# Patient Record
Sex: FEMALE | Race: WHITE | Employment: STUDENT | URBAN - METROPOLITAN AREA
[De-identification: names, ages, dates, MRNs, and addresses within clinical notes are randomized per-mention and may not be internally consistent; named-entity substitution may affect disease eponyms.]

---

## 2020-06-30 ENCOUNTER — VIRTUAL VISIT (OUTPATIENT)
Dept: INTERNAL MEDICINE | Facility: CLINIC | Age: 25
End: 2020-06-30
Payer: COMMERCIAL

## 2020-06-30 DIAGNOSIS — Z11.1 SCREENING EXAMINATION FOR PULMONARY TUBERCULOSIS: Primary | ICD-10-CM

## 2020-06-30 DIAGNOSIS — Z86.19 HISTORY OF CHICKEN POX: ICD-10-CM

## 2020-06-30 PROBLEM — L40.9 PSORIASIS: Status: ACTIVE | Noted: 2020-06-30

## 2020-06-30 PROCEDURE — 99201 ZZC OFFICE/OUTPT VISIT, NEW, LEVEL I: CPT | Mod: 95 | Performed by: PHYSICIAN ASSISTANT

## 2020-06-30 RX ORDER — GUSELKUMAB 100 MG/ML
INJECTION SUBCUTANEOUS
COMMUNITY
Start: 2020-06-30

## 2020-06-30 NOTE — PROGRESS NOTES
"  Sandra Key is a 25 year old female who is being evaluated via a billable video visit.      The patient has been notified of following:     \"This video visit will be conducted via a call between you and your physician/provider. We have found that certain health care needs can be provided without the need for an in-person physical exam.  This service lets us provide the care you need with a video conversation.  If a prescription is necessary we can send it directly to your pharmacy.  If lab work is needed we can place an order for that and you can then stop by our lab to have the test done at a later time.    Video visits are billed at different rates depending on your insurance coverage.  Please reach out to your insurance provider with any questions.    If during the course of the call the physician/provider feels a video visit is not appropriate, you will not be charged for this service.\"    Patient has given verbal consent for Video visit? Yes  How would you like to obtain your AVS? Mail a copy  Patient would like the video invitation sent by: Text to cell phone: [phone]  Will anyone else be joining your video visit? No      Subjective     Sandra Key is a 25 year old female who presents today via video visit for the following health issues:    HPI    Patient needs to get TB testing and vaccine for school.     - h/o chicken pox infection   - no h/o abnormal TB test   - no h/o TB exposure   - no TB symptoms       Reviewed and updated as needed this visit by Provider  Meds  Problems               Objective    Vitals - Patient Reported  Weight (Patient Reported): 61.2 kg (135 lb)  Height (Patient Reported): 162.6 cm (5' 4\")  BMI (Based on Pt Reported Ht/Wt): 23.17        Physical Exam     GENERAL: Healthy, alert and no distress  PSYCH: Mentation appears normal, affect normal/bright, judgement and insight intact, normal speech and appearance well-groomed.      Diagnostic Test " Results:  Labs reviewed in Epic        Assessment & Plan     1. Screening examination for pulmonary tuberculosis  - Quantiferon TB Gold Plus; Future    2. History of chicken pox  - Varicella Zoster Virus Antibody IgG; Future       No follow-ups on file.    Marcela Amaro PA-C  [facility]        No follow-ups on file.     Duration: 6 minutes     Marcela Amaro PA-C

## 2020-07-01 DIAGNOSIS — Z86.19 HISTORY OF CHICKEN POX: ICD-10-CM

## 2020-07-01 DIAGNOSIS — Z11.1 SCREENING EXAMINATION FOR PULMONARY TUBERCULOSIS: ICD-10-CM

## 2020-07-01 PROCEDURE — 86787 VARICELLA-ZOSTER ANTIBODY: CPT | Performed by: PHYSICIAN ASSISTANT

## 2020-07-01 PROCEDURE — 86481 TB AG RESPONSE T-CELL SUSP: CPT | Performed by: PHYSICIAN ASSISTANT

## 2020-07-01 PROCEDURE — 36415 COLL VENOUS BLD VENIPUNCTURE: CPT | Performed by: PHYSICIAN ASSISTANT

## 2020-07-02 LAB — VZV IGG SER QL IA: 1.6 AI (ref 0–0.8)

## 2020-07-03 LAB
GAMMA INTERFERON BACKGROUND BLD IA-ACNC: 0.02 IU/ML
M TB IFN-G BLD-IMP: NEGATIVE
M TB IFN-G CD4+ BCKGRND COR BLD-ACNC: 8.1 IU/ML
MITOGEN IGNF BCKGRD COR BLD-ACNC: 0 IU/ML
MITOGEN IGNF BCKGRD COR BLD-ACNC: 0.01 IU/ML

## 2020-08-21 ENCOUNTER — OFFICE VISIT (OUTPATIENT)
Dept: FAMILY MEDICINE | Facility: CLINIC | Age: 25
End: 2020-08-21
Payer: COMMERCIAL

## 2020-08-21 VITALS
BODY MASS INDEX: 21.68 KG/M2 | RESPIRATION RATE: 14 BRPM | DIASTOLIC BLOOD PRESSURE: 68 MMHG | HEART RATE: 85 BPM | WEIGHT: 127 LBS | HEIGHT: 64 IN | TEMPERATURE: 98 F | OXYGEN SATURATION: 99 % | SYSTOLIC BLOOD PRESSURE: 110 MMHG

## 2020-08-21 DIAGNOSIS — S09.91XA INJURY OF RIGHT EAR, INITIAL ENCOUNTER: ICD-10-CM

## 2020-08-21 DIAGNOSIS — B37.31 YEAST VAGINITIS: ICD-10-CM

## 2020-08-21 DIAGNOSIS — Z11.3 SCREEN FOR STD (SEXUALLY TRANSMITTED DISEASE): ICD-10-CM

## 2020-08-21 DIAGNOSIS — Z12.4 SCREENING FOR MALIGNANT NEOPLASM OF CERVIX: ICD-10-CM

## 2020-08-21 DIAGNOSIS — Z00.00 ROUTINE GENERAL MEDICAL EXAMINATION AT A HEALTH CARE FACILITY: Primary | ICD-10-CM

## 2020-08-21 LAB
SPECIMEN SOURCE: ABNORMAL
T PALLIDUM AB SER QL: NONREACTIVE
WET PREP SPEC: ABNORMAL

## 2020-08-21 PROCEDURE — 86780 TREPONEMA PALLIDUM: CPT | Performed by: PHYSICIAN ASSISTANT

## 2020-08-21 PROCEDURE — 87389 HIV-1 AG W/HIV-1&-2 AB AG IA: CPT | Performed by: PHYSICIAN ASSISTANT

## 2020-08-21 PROCEDURE — 86803 HEPATITIS C AB TEST: CPT | Performed by: PHYSICIAN ASSISTANT

## 2020-08-21 PROCEDURE — 87491 CHLMYD TRACH DNA AMP PROBE: CPT | Performed by: PHYSICIAN ASSISTANT

## 2020-08-21 PROCEDURE — 36415 COLL VENOUS BLD VENIPUNCTURE: CPT | Performed by: PHYSICIAN ASSISTANT

## 2020-08-21 PROCEDURE — 99395 PREV VISIT EST AGE 18-39: CPT | Performed by: PHYSICIAN ASSISTANT

## 2020-08-21 PROCEDURE — 87591 N.GONORRHOEAE DNA AMP PROB: CPT | Performed by: PHYSICIAN ASSISTANT

## 2020-08-21 PROCEDURE — 87210 SMEAR WET MOUNT SALINE/INK: CPT | Performed by: PHYSICIAN ASSISTANT

## 2020-08-21 PROCEDURE — G0145 SCR C/V CYTO,THINLAYER,RESCR: HCPCS | Performed by: PHYSICIAN ASSISTANT

## 2020-08-21 ASSESSMENT — MIFFLIN-ST. JEOR: SCORE: 1311.07

## 2020-08-21 NOTE — PROGRESS NOTES
h   SUBJECTIVE:   CC: Sandra Key is an 25 year old woman who presents for preventive health visit.     Healthy Habits:    Do you get at least three servings of calcium containing foods daily (dairy, green leafy vegetables, etc.)? yes    Amount of exercise or daily activities, outside of work: 6-7 day(s) per week    Problems taking medications regularly No    Medication side effects: No    Have you had an eye exam in the past two years? yes    Do you see a dentist twice per year? yes    Do you have sleep apnea, excessive snoring or daytime drowsiness?no    - Patient with new sex partner. After intercourse, developed vulvar itching and burning, intermittent sx. Used Monistat ovule 2 nights ago, now with some white colored discharge; doesn't know if it's the Monistat or something else. Requests STD screening.  - Was using Q-tip to remove ear wax. Inserted too far into Rt canal and developed severe pain. Resolved within a minute or so, but now gets intermittent dried blood from the canal. No change to hearing.    Today's PHQ-2 Score:   PHQ-2 ( 1999 Pfizer) 6/30/2020   Q1: Little interest or pleasure in doing things 0   Q2: Feeling down, depressed or hopeless 0   PHQ-2 Score 0     Abuse: Current or Past(Physical, Sexual or Emotional)- No  Do you feel safe in your environment? Yes    Social History     Tobacco Use     Smoking status: Never Smoker     Smokeless tobacco: Never Used   Substance Use Topics     Alcohol use: Yes     Comment: socially/occasionally     If you drink alcohol do you typically have >3 drinks per day or >7 drinks per week? No                     Reviewed orders with patient.  Reviewed health maintenance and updated orders accordingly - Yes  BP Readings from Last 3 Encounters:   08/21/20 110/68    Wt Readings from Last 3 Encounters:   08/21/20 57.6 kg (127 lb)            Patient Active Problem List   Diagnosis     Psoriasis     History reviewed. No pertinent surgical history.    Social  "History     Tobacco Use     Smoking status: Never Smoker     Smokeless tobacco: Never Used   Substance Use Topics     Alcohol use: Yes     Comment: socially/occasionally     History reviewed. No pertinent family history.        Mammogram not appropriate for this patient based on age.    Pertinent mammograms are reviewed under the imaging tab.  History of abnormal Pap smear: NO - age 21-29 PAP every 3 years recommended     Reviewed and updated as needed this visit by clinical staff  Tobacco  Allergies  Meds  Med Hx  Surg Hx  Fam Hx  Soc Hx        Reviewed and updated as needed this visit by Provider            ROS:  CONSTITUTIONAL: NEGATIVE for fever, chills, change in weight  INTEGUMENTARU/SKIN: NEGATIVE for worrisome rashes, moles or lesions  EYES: NEGATIVE for vision changes or irritation  ENT: NEGATIVE for ear, mouth and throat problems  RESP: NEGATIVE for significant cough or SOB  BREAST: NEGATIVE for masses, tenderness or discharge  CV: NEGATIVE for chest pain, palpitations or peripheral edema  GI: NEGATIVE for nausea, abdominal pain, heartburn, or change in bowel habits  : NEGATIVE for unusual urinary or vaginal symptoms. Periods are regular.  MUSCULOSKELETAL: NEGATIVE for significant arthralgias or myalgia  NEURO: NEGATIVE for weakness, dizziness or paresthesias  PSYCHIATRIC: NEGATIVE for changes in mood or affect    OBJECTIVE:   /68   Pulse 85   Temp 98  F (36.7  C) (Tympanic)   Resp 14   Ht 1.626 m (5' 4\")   Wt 57.6 kg (127 lb)   LMP 07/01/2020 (Approximate)   SpO2 99%   BMI 21.80 kg/m    EXAM:  GENERAL: healthy, alert and no distress  EYES: Eyes grossly normal to inspection, PERRL and conjunctivae and sclerae normal  HENT: ear canals normal, scant dried blood on Rt TM; bilat TMs intact, pearly gray w/o effusion. nose and mouth without ulcers or lesions  NECK: no adenopathy, no asymmetry, masses, or scars and thyroid normal to palpation  RESP: lungs clear to auscultation - no rales, " rhonchi or wheezes  BREAST: normal without masses, tenderness or nipple discharge and no palpable axillary masses or adenopathy  CV: regular rate and rhythm, normal S1 S2, no S3 or S4, no murmur, click or rub, no peripheral edema and peripheral pulses strong   (female): normal female external genitalia, normal urethral meatus, vaginal mucosa pink, moist, well rugated, and normal cervix/adnexa/uterus without masses. Copious white cream-like substance.  MS: no gross musculoskeletal defects noted, no edema  SKIN: no suspicious lesions or rashes  NEURO: Normal strength and tone, mentation intact and speech normal  PSYCH: mentation appears normal, affect normal/bright    Diagnostic Test Results:  Results for orders placed or performed in visit on 08/21/20 (from the past 24 hour(s))   Wet prep    Specimen: Vagina   Result Value Ref Range    Specimen Description Vagina     Wet Prep Moderate  Yeast seen   (A)     Wet Prep Few WBCs seen     Wet Prep Rare  Clue cells seen   (A)     Wet Prep No Trichomonas seen        ASSESSMENT/PLAN:       ICD-10-CM    1. Routine general medical examination at a health care facility  Z00.00    2. Yeast vaginitis  B37.3 Wet prep   3. Injury of right ear, initial encounter  S09.91XA    4. Screening for malignant neoplasm of cervix  Z12.4 Pap imaged thin layer screen only - recommended age 21 - 24 years   5. Screen for STD (sexually transmitted disease)  Z11.3 Chlamydia trachomatis PCR     Neisseria gonorrhoeae PCR     Wet prep     HIV Antigen Antibody Combo     Treponema Abs w Reflex to RPR and Titer     Hepatitis C Screen Reflex to HCV RNA Quant and Genotype     - Wet prep consistent with yeast vaginitis, already treated with OTC Monistat. No further management indicated.  - Lab work pending, will inform you of results when they return.  - Evidence of mechanical trauma to Rt TM, no rupture, reassured patient of routine healing. Avoid inserting anything into ear canal to prevent further  "damage.    COUNSELING:   Reviewed preventive health counseling, as reflected in patient instructions       Safe sex practices/STD prevention    Estimated body mass index is 21.8 kg/m  as calculated from the following:    Height as of this encounter: 1.626 m (5' 4\").    Weight as of this encounter: 57.6 kg (127 lb).         reports that she has never smoked. She has never used smokeless tobacco.      Counseling Resources:  ATP IV Guidelines  Pooled Cohorts Equation Calculator  Breast Cancer Risk Calculator  FRAX Risk Assessment  ICSI Preventive Guidelines  Dietary Guidelines for Americans, 2010  USDA's MyPlate  ASA Prophylaxis  Lung CA Screening    Isamar Porras PA-C  [facility]  "

## 2020-08-23 LAB
C TRACH DNA SPEC QL NAA+PROBE: NEGATIVE
N GONORRHOEA DNA SPEC QL NAA+PROBE: NEGATIVE
SPECIMEN SOURCE: NORMAL
SPECIMEN SOURCE: NORMAL

## 2020-08-24 LAB
HCV AB SERPL QL IA: NONREACTIVE
HIV 1+2 AB+HIV1 P24 AG SERPL QL IA: NONREACTIVE

## 2020-08-24 NOTE — RESULT ENCOUNTER NOTE
Darin Flores    I just wanted to let you know that your lab results have been reviewed and are attached.    - Your STD test panel was negative for infection.  - You will receive your Pap Smear results in a separate message.    Please let me know if you have any questions.    Sincerely,    Isamar Porras PA-C    [facility]  [address]  [place], [place] [address] [phone] (p)

## 2020-08-27 LAB
COPATH REPORT: NORMAL
PAP: NORMAL

## 2020-10-30 ENCOUNTER — OFFICE VISIT (OUTPATIENT)
Dept: FAMILY MEDICINE | Facility: CLINIC | Age: 25
End: 2020-10-30
Payer: COMMERCIAL

## 2020-10-30 VITALS
BODY MASS INDEX: 22.49 KG/M2 | WEIGHT: 131 LBS | RESPIRATION RATE: 14 BRPM | HEART RATE: 78 BPM | TEMPERATURE: 97 F | DIASTOLIC BLOOD PRESSURE: 60 MMHG | SYSTOLIC BLOOD PRESSURE: 110 MMHG

## 2020-10-30 DIAGNOSIS — Z23 NEED FOR VACCINATION: ICD-10-CM

## 2020-10-30 DIAGNOSIS — Z97.5 NEXPLANON IN PLACE: ICD-10-CM

## 2020-10-30 DIAGNOSIS — N91.1 DRUG INDUCED AMENORRHEA: Primary | ICD-10-CM

## 2020-10-30 DIAGNOSIS — T50.905A DRUG INDUCED AMENORRHEA: Primary | ICD-10-CM

## 2020-10-30 PROCEDURE — 90471 IMMUNIZATION ADMIN: CPT | Performed by: INTERNAL MEDICINE

## 2020-10-30 PROCEDURE — 99213 OFFICE O/P EST LOW 20 MIN: CPT | Mod: 25 | Performed by: INTERNAL MEDICINE

## 2020-10-30 PROCEDURE — 90715 TDAP VACCINE 7 YRS/> IM: CPT | Performed by: INTERNAL MEDICINE

## 2020-10-30 NOTE — PATIENT INSTRUCTIONS
Your menstrual absence is due to Nexplanon, no need of any concerns.     ===========================    Patient Education     Etonogestrel implant  What is this medicine?  ETONOGESTREL (et oh leena ARISTEO trel) is a contraceptive (birth control) device. It is used to prevent pregnancy. It can be used for up to 3 years.  How should I use this medicine?  This device is inserted just under the skin on the inner side of your upper arm by a health care professional.  Talk to your pediatrician regarding the use of this medicine in children. Special care may be needed.  What side effects may I notice from receiving this medicine?  Side effects that you should report to your doctor or health care professional as soon as possible:    allergic reactions like skin rash, itching or hives, swelling of the face, lips, or tongue    breast lumps    changes in emotions or moods    depressed mood    heavy or prolonged menstrual bleeding    pain, irritation, swelling, or bruising at the insertion site    scar at site of insertion    signs of infection at the insertion site such as fever, and skin redness, pain or discharge    signs of pregnancy    signs and symptoms of a blood clot such as breathing problems; changes in vision; chest pain; severe, sudden headache; pain, swelling, warmth in the leg; trouble speaking; sudden numbness or weakness of the face, arm or leg    signs and symptoms of liver injury like dark yellow or brown urine; general ill feeling or flu-like symptoms; light-colored stools; loss of appetite; nausea; right upper belly pain; unusually weak or tired; yellowing of the eyes or skin    unusual vaginal bleeding, discharge    signs and symptoms of a stroke like changes in vision; confusion; trouble speaking or understanding; severe headaches; sudden numbness or weakness of the face, arm or leg; trouble walking; dizziness; loss of balance or coordination  Side effects that usually do not require medical attention (report  to your doctor or health care professional if they continue or are bothersome):    acne    back pain    breast pain    changes in weight    dizziness    general ill feeling or flu-like symptoms    headache    irregular menstrual bleeding    nausea    sore throat    vaginal irritation or inflammation  What may interact with this medicine?  Do not take this medicine with any of the following medications:    amprenavir    fosamprenavir  This medicine may also interact with the following medications:    acitretin    aprepitant    armodafinil    bexarotene    bosentan    carbamazepine    certain medicines for fungal infections like fluconazole, ketoconazole, itraconazole and voriconazole    certain medicines to treat hepatitis, HIV or AIDS    cyclosporine    felbamate    griseofulvin    lamotrigine    modafinil    oxcarbazepine    phenobarbital    phenytoin    primidone    rifabutin    rifampin    rifapentine    Jeanie's wort    topiramate  What if I miss a dose?  This does not apply.  Where should I keep my medicine?  This drug is given in a hospital or clinic and will not be stored at home.  What should I tell my health care provider before I take this medicine?  They need to know if you have any of these conditions:    abnormal vaginal bleeding    blood vessel disease or blood clots    breast, cervical, endometrial, ovarian, liver, or uterine cancer    diabetes    gallbladder disease    heart disease or recent heart attack    high blood pressure    high cholesterol or triglycerides    kidney disease    liver disease    migraine headaches    seizures    stroke    tobacco smoker    an unusual or allergic reaction to etonogestrel, anesthetics or antiseptics, other medicines, foods, dyes, or preservatives    pregnant or trying to get pregnant    breast-feeding  What should I watch for while using this medicine?  This product does not protect you against HIV infection (AIDS) or other sexually transmitted diseases.  You  should be able to feel the implant by pressing your fingertips over the skin where it was inserted. Contact your doctor if you cannot feel the implant, and use a non-hormonal birth control method (such as condoms) until your doctor confirms that the implant is in place. Contact your doctor if you think that the implant may have broken or become bent while in your arm.  You will receive a user card from your health care provider after the implant is inserted. The card is a record of the location of the implant in your upper arm and when it should be removed. Keep this card with your health records.  NOTE:This sheet is a summary. It may not cover all possible information. If you have questions about this medicine, talk to your doctor, pharmacist, or health care provider. Copyright  2020 [employer]

## 2020-10-30 NOTE — NURSING NOTE
Prior to immunization administration, verified patients identity using patient s name and date of birth. Please see Immunization Activity for additional information.     Screening Questionnaire for Adult Immunization    Are you sick today?   No   Do you have allergies to medications, food, a vaccine component or latex?   No   Have you ever had a serious reaction after receiving a vaccination?   No   Do you have a long-term health problem with heart, lung, kidney, or metabolic disease (e.g., diabetes), asthma, a blood disorder, no spleen, complement component deficiency, a cochlear implant, or a spinal fluid leak?  Are you on long-term aspirin therapy?   No   Do you have cancer, leukemia, HIV/AIDS, or any other immune system problem?   No   Do you have a parent, brother, or sister with an immune system problem?   No   In the past 3 months, have you taken medications that affect  your immune system, such as prednisone, other steroids, or anticancer drugs; drugs for the treatment of rheumatoid arthritis, Crohn s disease, or psoriasis; or have you had radiation treatments?   No   Have you had a seizure, or a brain or other nervous system problem?   No   During the past year, have you received a transfusion of blood or blood    products, or been given immune (gamma) globulin or antiviral drug?   No   For women: Are you pregnant or is there a chance you could become       pregnant during the next month?   No   Have you received any vaccinations in the past 4 weeks?   No     Immunization questionnaire answers were all negative.        Per orders of Dr. Michaels, injection of tdap given by Brigid Vick CMA. Patient instructed to remain in clinic for 15 minutes afterwards, and to report any adverse reaction to me immediately.       Screening performed by Brigid Vick CMA on 10/30/2020 at 4:29 PM.

## 2020-10-30 NOTE — PROGRESS NOTES
Subjective     Sandra No Mi Yesi is a 25 year old female who presents to clinic today for the following health issues:    HPI         Concern - Amenorrhea  Onset: since June  Description: LMP 6/30/20  Intensity: mild  Progression of Symptoms:  same  Accompanying Signs & Symptoms: none  Previous history of similar problem: Pt has nexplanon  Precipitating factors:        Worsened by: none  Alleviating factors:        Improved by: none  Therapies tried and outcome: negative pregnancy test     No Known Allergies     History reviewed. No pertinent past medical history.    History reviewed. No pertinent surgical history.    History reviewed. No pertinent family history.    Social History     Tobacco Use     Smoking status: Never Smoker     Smokeless tobacco: Never Used   Substance Use Topics     Alcohol use: Yes     Comment: socially/occasionally        Current Outpatient Medications   Medication     etonogestrel (NEXPLANON) 68 MG IMPL     Guselkumab (TREMFYA) 100 MG/ML SOPN     No current facility-administered medications for this visit.         Review of Systems   Constitutional, HEENT, cardiovascular, pulmonary, GI, , musculoskeletal, neuro, skin, endocrine and psych systems are negative, except as otherwise noted.      Objective    /60   Pulse 78   Temp 97  F (36.1  C) (Tympanic)   Resp 14   Wt 59.4 kg (131 lb)   LMP 06/30/2020   BMI 22.49 kg/m    Body mass index is 22.49 kg/m .  Physical Exam   GENERAL: healthy, alert and no distress  NECK: no adenopathy, no asymmetry, masses, or scars and thyroid normal to palpation  RESP: lungs clear to auscultation - no rales, rhonchi or wheezes  CV: regular rate and rhythm, normal S1 S2, no S3 or S4, no murmur, click or rub, no peripheral edema and peripheral pulses strong  ABDOMEN: soft, nontender, no hepatosplenomegaly, no masses and bowel sounds normal  MS: no gross musculoskeletal defects noted, no edema  POSITIVE for Nexplanon on the medial aspect of  the Rt upper arm.    Labs and imaging reviewed and discussed with the patient.        Assessment and Plan  1. Drug induced amenorrhea  2. Nexplanon in place  Reasssured the patient that her menstrual disturbances are due to her Nexplanon. Answered all the questions of the pateint.    3. Need for vaccination  - TDAP VACCINE (Adacel, Boostrix)  [[id number]]         Patient Instructions   Your menstrual absence is due to Nexplanon, no need of any concerns.     ===========================    Patient Education     Etonogestrel implant  What is this medicine?  ETONOGESTREL (et oh leena ARISTEO trel) is a contraceptive (birth control) device. It is used to prevent pregnancy. It can be used for up to 3 years.  How should I use this medicine?  This device is inserted just under the skin on the inner side of your upper arm by a health care professional.  Talk to your pediatrician regarding the use of this medicine in children. Special care may be needed.  What side effects may I notice from receiving this medicine?  Side effects that you should report to your doctor or health care professional as soon as possible:    allergic reactions like skin rash, itching or hives, swelling of the face, lips, or tongue    breast lumps    changes in emotions or moods    depressed mood    heavy or prolonged menstrual bleeding    pain, irritation, swelling, or bruising at the insertion site    scar at site of insertion    signs of infection at the insertion site such as fever, and skin redness, pain or discharge    signs of pregnancy    signs and symptoms of a blood clot such as breathing problems; changes in vision; chest pain; severe, sudden headache; pain, swelling, warmth in the leg; trouble speaking; sudden numbness or weakness of the face, arm or leg    signs and symptoms of liver injury like dark yellow or brown urine; general ill feeling or flu-like symptoms; light-colored stools; loss of appetite; nausea; right upper belly pain; unusually  weak or tired; yellowing of the eyes or skin    unusual vaginal bleeding, discharge    signs and symptoms of a stroke like changes in vision; confusion; trouble speaking or understanding; severe headaches; sudden numbness or weakness of the face, arm or leg; trouble walking; dizziness; loss of balance or coordination  Side effects that usually do not require medical attention (report to your doctor or health care professional if they continue or are bothersome):    acne    back pain    breast pain    changes in weight    dizziness    general ill feeling or flu-like symptoms    headache    irregular menstrual bleeding    nausea    sore throat    vaginal irritation or inflammation  What may interact with this medicine?  Do not take this medicine with any of the following medications:    amprenavir    fosamprenavir  This medicine may also interact with the following medications:    acitretin    aprepitant    armodafinil    bexarotene    bosentan    carbamazepine    certain medicines for fungal infections like fluconazole, ketoconazole, itraconazole and voriconazole    certain medicines to treat hepatitis, HIV or AIDS    cyclosporine    felbamate    griseofulvin    lamotrigine    modafinil    oxcarbazepine    phenobarbital    phenytoin    primidone    rifabutin    rifampin    rifapentine    Jeanie's wort    topiramate  What if I miss a dose?  This does not apply.  Where should I keep my medicine?  This drug is given in a hospital or clinic and will not be stored at home.  What should I tell my health care provider before I take this medicine?  They need to know if you have any of these conditions:    abnormal vaginal bleeding    blood vessel disease or blood clots    breast, cervical, endometrial, ovarian, liver, or uterine cancer    diabetes    gallbladder disease    heart disease or recent heart attack    high blood pressure    high cholesterol or triglycerides    kidney disease    liver disease    migraine  headaches    seizures    stroke    tobacco smoker    an unusual or allergic reaction to etonogestrel, anesthetics or antiseptics, other medicines, foods, dyes, or preservatives    pregnant or trying to get pregnant    breast-feeding  What should I watch for while using this medicine?  This product does not protect you against HIV infection (AIDS) or other sexually transmitted diseases.  You should be able to feel the implant by pressing your fingertips over the skin where it was inserted. Contact your doctor if you cannot feel the implant, and use a non-hormonal birth control method (such as condoms) until your doctor confirms that the implant is in place. Contact your doctor if you think that the implant may have broken or become bent while in your arm.  You will receive a user card from your health care provider after the implant is inserted. The card is a record of the location of the implant in your upper arm and when it should be removed. Keep this card with your health records.  NOTE:This sheet is a summary. It may not cover all possible information. If you have questions about this medicine, talk to your doctor, pharmacist, or health care provider. Copyright  2020 [employer]               Return in about 1 year (around 10/30/2021), or if symptoms worsen or fail to improve.    Aleena Michaels MD  [facility]

## 2021-07-07 ENCOUNTER — VIRTUAL VISIT (OUTPATIENT)
Dept: INTERNAL MEDICINE | Facility: CLINIC | Age: 26
End: 2021-07-07
Payer: COMMERCIAL

## 2021-07-07 DIAGNOSIS — Z11.1 SCREENING EXAMINATION FOR PULMONARY TUBERCULOSIS: Primary | ICD-10-CM

## 2021-07-07 PROCEDURE — 99212 OFFICE O/P EST SF 10 MIN: CPT | Mod: 95 | Performed by: INTERNAL MEDICINE

## 2021-07-07 NOTE — PROGRESS NOTES
TELEPHONE VISIT                                                      ASSESSMENT/PLAN                                                      (Z11.1) Screening examination for pulmonary tuberculosis  (primary encounter diagnosis)  Comment: asymptomatic; no known exposures.  Plan: Quantiferon TB Gold Plus ordered - lab appointment scheduled.    Total time of call between patient and provider was 5 minutes. Provider location: office. Patient location: home.    Pati Jensen MD   [facility]  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 No prior history of positive PPD. No known or suspected exposures to TB.  No cough, hemoptysis, anorexia, unintentional weight loss, or night sweats.    No other questions or concerns today.    ---    (Note was completed, in part, with [email or web address] voice-recognition software. Documentation reviewed, but some grammatical, spelling, and word errors may remain.)

## 2021-07-09 DIAGNOSIS — Z11.1 SCREENING EXAMINATION FOR PULMONARY TUBERCULOSIS: ICD-10-CM

## 2021-07-09 LAB
GAMMA INTERFERON BACKGROUND BLD IA-ACNC: NORMAL IU/ML
M TB IFN-G CD4+ BCKGRND COR BLD-ACNC: NORMAL IU/ML
M TB TUBERC IFN-G BLD QL: NORMAL
MITOGEN IGNF BCKGRD COR BLD-ACNC: NORMAL IU/ML

## 2021-07-10 DIAGNOSIS — Z11.1 SCREENING EXAMINATION FOR PULMONARY TUBERCULOSIS: Primary | ICD-10-CM

## 2021-07-13 ENCOUNTER — LAB (OUTPATIENT)
Dept: LAB | Facility: CLINIC | Age: 26
End: 2021-07-13
Payer: COMMERCIAL

## 2021-07-13 DIAGNOSIS — Z11.1 SCREENING EXAMINATION FOR PULMONARY TUBERCULOSIS: ICD-10-CM

## 2021-07-13 PROCEDURE — 86481 TB AG RESPONSE T-CELL SUSP: CPT

## 2021-07-13 PROCEDURE — 36415 COLL VENOUS BLD VENIPUNCTURE: CPT

## 2021-07-15 LAB
QUANTIFERON MITOGEN: 10 IU/ML
QUANTIFERON NIL TUBE: 0.02 IU/ML
QUANTIFERON TB1 TUBE: 0.02 IU/ML
QUANTIFERON TB2 TUBE: 0.05

## 2021-07-16 LAB
GAMMA INTERFERON BACKGROUND BLD IA-ACNC: 0.02 IU/ML
M TB IFN-G BLD-IMP: NEGATIVE
M TB IFN-G CD4+ BCKGRND COR BLD-ACNC: 9.98 IU/ML
MITOGEN IGNF BCKGRD COR BLD-ACNC: 0 IU/ML
MITOGEN IGNF BCKGRD COR BLD-ACNC: 0.03 IU/ML

## 2021-07-19 ENCOUNTER — TELEPHONE (OUTPATIENT)
Dept: INTERNAL MEDICINE | Facility: CLINIC | Age: 26
End: 2021-07-19

## 2021-07-19 NOTE — CONFIDENTIAL NOTE
Pt called stating she needs a note for school with the clinic letter head on it.  Letter written.   Annika Soliman RN

## 2021-10-10 ENCOUNTER — HEALTH MAINTENANCE LETTER (OUTPATIENT)
Age: 26
End: 2021-10-10

## 2021-12-27 ENCOUNTER — OFFICE VISIT (OUTPATIENT)
Dept: INTERNAL MEDICINE | Facility: CLINIC | Age: 26
End: 2021-12-27
Payer: COMMERCIAL

## 2021-12-27 VITALS
HEART RATE: 89 BPM | DIASTOLIC BLOOD PRESSURE: 54 MMHG | SYSTOLIC BLOOD PRESSURE: 106 MMHG | WEIGHT: 142 LBS | OXYGEN SATURATION: 100 % | BODY MASS INDEX: 24.24 KG/M2 | TEMPERATURE: 98.2 F | HEIGHT: 64 IN

## 2021-12-27 DIAGNOSIS — L02.91 ABSCESS: Primary | ICD-10-CM

## 2021-12-27 PROCEDURE — 99214 OFFICE O/P EST MOD 30 MIN: CPT | Performed by: NURSE PRACTITIONER

## 2021-12-27 RX ORDER — HYDROCODONE BITARTRATE AND ACETAMINOPHEN 5; 325 MG/1; MG/1
1 TABLET ORAL EVERY 6 HOURS PRN
Qty: 10 TABLET | Refills: 0 | Status: SHIPPED | OUTPATIENT
Start: 2021-12-27 | End: 2021-12-30

## 2021-12-27 RX ORDER — DOXYCYCLINE HYCLATE 100 MG
100 TABLET ORAL 2 TIMES DAILY
Qty: 14 TABLET | Refills: 0 | Status: SHIPPED | OUTPATIENT
Start: 2021-12-27 | End: 2022-01-03

## 2021-12-27 RX ORDER — NAPROXEN 500 MG/1
500 TABLET ORAL 2 TIMES DAILY WITH MEALS
Qty: 60 TABLET | Refills: 0 | Status: SHIPPED | OUTPATIENT
Start: 2021-12-27

## 2021-12-27 ASSESSMENT — MIFFLIN-ST. JEOR: SCORE: 1374.11

## 2021-12-27 NOTE — PROGRESS NOTES
Assessment & Plan     Abscess  - Presents with a 5-day history of right axillary lump, painful, enlarging in size over this period of time.  Exam is consistent with axillary abscess.  The abscess is still quite firm, no fluctuance, so does not meet criteria at this time for incision and drainage.  - Advised to hot pack frequently during the day to facilitate drainage  - She has also been taking we will prescribe doxycycline 100 mg twice daily for 7 days  - Naproxen 500 mg twice daily as needed for pain to use during the day while at work, take with food.  Counseled on use, and advised not to take with ibuprofen  - Norco 1 tablet every 6 hours as needed for severe pain.  Counseled on use, not to drive or work with use.  Only to take as prescribed.  - Reviewed with patient that if her abscess becomes fluctuant and is not draining, she should come in for an incision and drainage; can go to the urgent care to have this done as well.  - Patient verbalized understanding and agreed to plan of care  - doxycycline hyclate (VIBRA-TABS) 100 MG tablet  Dispense: 14 tablet; Refill: 0  - HYDROcodone-acetaminophen (NORCO) 5-325 MG tablet  Dispense: 10 tablet; Refill: 0  - naproxen (NAPROSYN) 500 MG tablet  Dispense: 60 tablet; Refill: 0      See Patient Instructions    Return if symptoms worsen or fail to improve.    KASHMIR Dennis [facility]    Note was completed, in part, with Dragon voice recognition software.  Documentation was reviewed but some grammatical, spelling, and word errors may remain.    Oleg Flores is a 26 year old who presents for the following health issues    HPI     Concern - lump  Onset: 12-22-21  Description:  right  axillary  Intensity: moderate  Progression of Symptoms:  worsening and constant  Accompanying Signs & Symptoms: swollen, tender, pain with pressure (putting arm down)  Previous history of similar problem: none  Precipitating factors:  "       Worsened by: putting arm down, pressure with over head motion  Alleviating factors:        Improved by:none  Therapies tried and outcome: heat pack, IBU    Patient is a very pleasant 26-year-old female who presents today with right axillary lump.  Past medical history is significant for psoriasis, previously on Tremfya, which has since been discontinued.  She reports that she has noted a painful lump under her right axilla that has been increasingly getting bigger over the past 5 days, along with discomfort.  She reports that she has increased pain with moving her arm as she reports a sensation of pressure in that area with any type of movement.  She is a  and notes that she has increased pain while at work due to moving her arm so frequently.  She denies any fevers or chills.  She endorses that she has had two mildly enlarged lymph nodes in that same area however, never bothered by them previously.  She has been using a Band-Aid with warm compresses which has not been helpful.  Up to 6 ibuprofen at a time without any relief in pain.    Review of Systems   CONSTITUTIONAL:NEGATIVE for fever, chills, change in weight  INTEGUMENTARY/SKIN: History of psoriasis previously on Tremfya, which has since been discontinued.  Follows with a dermatologist in [place].  Reports psoriasis is under good control and is no longer needing medications.  Painful lump under right axilla  RESP:NEGATIVE for significant cough or SOB      Objective    /54   Pulse 89   Temp 98.2  F (36.8  C) (Oral)   Ht 1.626 m (5' 4\")   Wt 64.4 kg (142 lb)   LMP 11/30/2021 (Approximate)   SpO2 100%   Breastfeeding No   BMI 24.37 kg/m    Body mass index is 24.37 kg/m .     Physical Exam   GENERAL APPEARANCE: healthy, alert and no distress  EYES: Eyes grossly normal to inspection, PERRL and conjunctivae and sclerae normal  NECK: no adenopathy, no asymmetry, masses   RESP: lungs clear to auscultation - no rales, rhonchi or " wheezes  CV: regular rates and rhythm, normal S1 S2, no S3 or S4 and no murmur, click or rub  LYMPHATICS: no cervical adenopathy, no appreciable axillary adenopathy  MS: extremities normal- no gross deformities noted  SKIN: Right axillary lump consistent with abscess.  No surrounding erythema.  Moderately tender to palpation.  Area is firm, without any fluctuance.  PSYCH: mentation appears normal and affect normal/bright

## 2021-12-27 NOTE — PATIENT INSTRUCTIONS
-Take the doxycycline 1 pill twice for 7 days  -Use warm packs to help facilitate drainage  -Naproxen 1 tablet twice daily with food. DO NOT take with Ibuprofen  -Norco (hydrocodone) 1 tab every 6 hours as needed for severe pain. Do not drive, work while taking.  Do not take more than as directed  -IF the abscess becomes soft/squishy and is not draining, then you can to to Urgent care to have an incision and drainage.

## 2022-01-05 ENCOUNTER — LAB REQUISITION (OUTPATIENT)
Dept: LAB | Facility: CLINIC | Age: 27
End: 2022-01-05

## 2022-01-05 LAB — HBV SURFACE AB SERPL IA-ACNC: 0 M[IU]/ML

## 2022-01-05 PROCEDURE — 86481 TB AG RESPONSE T-CELL SUSP: CPT

## 2022-01-05 PROCEDURE — 86787 VARICELLA-ZOSTER ANTIBODY: CPT

## 2022-01-05 PROCEDURE — 86706 HEP B SURFACE ANTIBODY: CPT

## 2022-01-06 LAB
VZV IGG SER QL IA: 229.1 INDEX
VZV IGG SER QL IA: POSITIVE

## 2022-01-07 LAB
GAMMA INTERFERON BACKGROUND BLD IA-ACNC: 0.03 IU/ML
M TB IFN-G BLD-IMP: NEGATIVE
M TB IFN-G CD4+ BCKGRND COR BLD-ACNC: 9.97 IU/ML
MITOGEN IGNF BCKGRD COR BLD-ACNC: 0.01 IU/ML
MITOGEN IGNF BCKGRD COR BLD-ACNC: 0.03 IU/ML
QUANTIFERON MITOGEN: 10 IU/ML
QUANTIFERON NIL TUBE: 0.03 IU/ML
QUANTIFERON TB1 TUBE: 0.06 IU/ML
QUANTIFERON TB2 TUBE: 0.04

## 2022-09-14 ENCOUNTER — LAB REQUISITION (OUTPATIENT)
Dept: LAB | Facility: CLINIC | Age: 27
End: 2022-09-14
Payer: COMMERCIAL

## 2022-09-14 DIAGNOSIS — Z01.419 ENCOUNTER FOR GYNECOLOGICAL EXAMINATION (GENERAL) (ROUTINE) WITHOUT ABNORMAL FINDINGS: ICD-10-CM

## 2022-09-14 DIAGNOSIS — Z11.3 ENCOUNTER FOR SCREENING FOR INFECTIONS WITH A PREDOMINANTLY SEXUAL MODE OF TRANSMISSION: ICD-10-CM

## 2022-09-14 PROCEDURE — 87491 CHLMYD TRACH DNA AMP PROBE: CPT | Mod: ORL | Performed by: NURSE PRACTITIONER

## 2022-09-14 PROCEDURE — G0145 SCR C/V CYTO,THINLAYER,RESCR: HCPCS | Mod: ORL | Performed by: NURSE PRACTITIONER

## 2022-09-14 PROCEDURE — 87591 N.GONORRHOEAE DNA AMP PROB: CPT | Mod: ORL | Performed by: NURSE PRACTITIONER

## 2022-09-15 LAB
C TRACH DNA SPEC QL PROBE+SIG AMP: NEGATIVE
N GONORRHOEA DNA SPEC QL NAA+PROBE: NEGATIVE

## 2022-09-16 LAB
BKR LAB AP GYN ADEQUACY: NORMAL
BKR LAB AP GYN INTERPRETATION: NORMAL
BKR LAB AP HPV REFLEX: NORMAL
BKR LAB AP LMP: NORMAL
BKR LAB AP PREVIOUS ABNL DX: NORMAL
BKR LAB AP PREVIOUS ABNORMAL: NORMAL
PATH REPORT.COMMENTS IMP SPEC: NORMAL
PATH REPORT.COMMENTS IMP SPEC: NORMAL
PATH REPORT.RELEVANT HX SPEC: NORMAL

## 2022-09-24 ENCOUNTER — HEALTH MAINTENANCE LETTER (OUTPATIENT)
Age: 27
End: 2022-09-24

## 2023-01-29 ENCOUNTER — HEALTH MAINTENANCE LETTER (OUTPATIENT)
Age: 28
End: 2023-01-29

## 2024-02-25 ENCOUNTER — HEALTH MAINTENANCE LETTER (OUTPATIENT)
Age: 29
End: 2024-02-25